# Patient Record
Sex: FEMALE | URBAN - METROPOLITAN AREA
[De-identification: names, ages, dates, MRNs, and addresses within clinical notes are randomized per-mention and may not be internally consistent; named-entity substitution may affect disease eponyms.]

---

## 2021-08-24 ENCOUNTER — OFFICE VISIT (OUTPATIENT)
Dept: URBAN - METROPOLITAN AREA CLINIC 91 | Facility: CLINIC | Age: 46
End: 2021-08-24
Payer: COMMERCIAL

## 2021-08-24 DIAGNOSIS — H53.022 REFRACTIVE AMBLYOPIA, LEFT EYE: Primary | ICD-10-CM

## 2021-08-24 PROCEDURE — 99204 OFFICE O/P NEW MOD 45 MIN: CPT | Performed by: SPECIALIST

## 2021-08-24 ASSESSMENT — INTRAOCULAR PRESSURE
OS: 16
OD: 16

## 2021-08-24 ASSESSMENT — VISUAL ACUITY
OS: 20/30
OD: 20/20

## 2021-08-24 NOTE — IMPRESSION/PLAN
Impression: Refractive amblyopia, left eye: H53.022. Plan: Discussed Amblyopia left eye, unable to tolerate MRx therefor i reduced sph in the left eye to minimize double vision. If new MRx is not well tolerate patient to schedule a sooner appt.

## 2021-08-24 NOTE — IMPRESSION/PLAN
Impression: Dermatochalasis of left upper eyelid: H02.834. Plan: Drooping upper eyelids were discussed with patient. Patient would like to proceed with Bleph BUL, patient to return for testing and pre op.

## 2021-08-24 NOTE — IMPRESSION/PLAN
Impression: Dermatochalasis of right upper eyelid: H02.831. Plan: see notes above. RV in 1-2 for short follow up and VF bleph and external photos with same day pre op.

## 2021-08-31 ENCOUNTER — OFFICE VISIT (OUTPATIENT)
Dept: URBAN - METROPOLITAN AREA CLINIC 91 | Facility: CLINIC | Age: 46
End: 2021-08-31
Payer: COMMERCIAL

## 2021-08-31 DIAGNOSIS — H02.831 DERMATOCHALASIS OF RIGHT UPPER EYELID: Primary | ICD-10-CM

## 2021-08-31 DIAGNOSIS — H02.834 DERMATOCHALASIS OF LEFT UPPER EYELID: ICD-10-CM

## 2021-08-31 PROCEDURE — 92285 EXTERNAL OCULAR PHOTOGRAPHY: CPT | Performed by: SPECIALIST

## 2021-08-31 PROCEDURE — 92081 LIMITED VISUAL FIELD XM: CPT | Performed by: SPECIALIST

## 2021-08-31 PROCEDURE — 99212 OFFICE O/P EST SF 10 MIN: CPT | Performed by: SPECIALIST

## 2021-08-31 RX ORDER — NEOMYCIN SULFATE, POLYMYXIN B SULFATE AND DEXAMETHASONE 3.5; 10000; 1 MG/G; [USP'U]/G; MG/G
OINTMENT OPHTHALMIC
Qty: 3.5 | Refills: 1 | Status: INACTIVE
Start: 2021-08-31 | End: 2021-08-31

## 2021-08-31 RX ORDER — CEPHALEXIN 250 MG/1
250 MG CAPSULE ORAL
Qty: 28 | Refills: 0 | Status: ACTIVE
Start: 2021-08-31

## 2021-10-06 ENCOUNTER — SURGERY (OUTPATIENT)
Dept: URBAN - METROPOLITAN AREA EXTERNAL CLINIC 49 | Facility: EXTERNAL CLINIC | Age: 46
End: 2021-10-06
Payer: COMMERCIAL

## 2021-10-19 ENCOUNTER — POST-OPERATIVE VISIT (OUTPATIENT)
Dept: URBAN - METROPOLITAN AREA CLINIC 91 | Facility: CLINIC | Age: 46
End: 2021-10-19
Payer: COMMERCIAL

## 2021-10-19 DIAGNOSIS — Z48.810 ENCOUNTER FOR SURGICAL AFTERCARE FOLLOWING SURGERY ON A SENSE ORGAN: Primary | ICD-10-CM

## 2021-10-19 PROCEDURE — 99024 POSTOP FOLLOW-UP VISIT: CPT | Performed by: SPECIALIST

## 2021-10-19 NOTE — IMPRESSION/PLAN
Impression: S/P Blepharoplasty OU - 13 Days. Encounter for surgical aftercare following surgery on a sense organ  Z48.810. Excellent post op course   Post operative instructions reviewed - Condition is improving - bleph BUL. Plan: Patient healing well, sutures removed today no complications, recommend vitamine cream. d/c sarah.

## 2022-02-22 ENCOUNTER — OFFICE VISIT (OUTPATIENT)
Dept: URBAN - METROPOLITAN AREA CLINIC 91 | Facility: CLINIC | Age: 47
End: 2022-02-22
Payer: COMMERCIAL

## 2022-02-22 DIAGNOSIS — H02.824 CYSTS OF LEFT UPPER EYELID: Primary | ICD-10-CM

## 2022-02-22 PROCEDURE — 99213 OFFICE O/P EST LOW 20 MIN: CPT | Performed by: SPECIALIST

## 2022-02-22 ASSESSMENT — INTRAOCULAR PRESSURE
OS: 15
OD: 17

## 2022-02-22 NOTE — IMPRESSION/PLAN
Impression: Cysts of left upper eyelid: H02.824. Plan: Explained cysts RADHA, patient to use warm compresses BID, discuss if condition does not improve will consider lesion excision. Patient will wait at this time and will go see dermatologist. Will continue to monitor.